# Patient Record
Sex: MALE | Race: WHITE | URBAN - METROPOLITAN AREA
[De-identification: names, ages, dates, MRNs, and addresses within clinical notes are randomized per-mention and may not be internally consistent; named-entity substitution may affect disease eponyms.]

---

## 2021-02-12 LAB
ABS NEUT (OLG): 4.98 X10(3)/MCL (ref 2.1–9.2)
BASOPHILS # BLD AUTO: 0.1 X10(3)/MCL (ref 0–0.2)
BASOPHILS NFR BLD AUTO: 1 %
BUN SERPL-MCNC: 15 MG/DL (ref 8.9–20.6)
CALCIUM SERPL-MCNC: 9.6 MG/DL (ref 8.4–10.2)
CHLORIDE SERPL-SCNC: 105 MMOL/L (ref 98–107)
CO2 SERPL-SCNC: 29 MMOL/L (ref 22–29)
CREAT SERPL-MCNC: 0.93 MG/DL (ref 0.73–1.18)
CREAT/UREA NIT SERPL: 16
EOSINOPHIL # BLD AUTO: 0.2 X10(3)/MCL (ref 0–0.9)
EOSINOPHIL NFR BLD AUTO: 2 %
ERYTHROCYTE [DISTWIDTH] IN BLOOD BY AUTOMATED COUNT: 14.3 % (ref 11.5–17)
GLUCOSE SERPL-MCNC: 94 MG/DL (ref 74–100)
HCT VFR BLD AUTO: 45.9 % (ref 42–52)
HGB BLD-MCNC: 14.7 GM/DL (ref 14–18)
LYMPHOCYTES # BLD AUTO: 2.9 X10(3)/MCL (ref 0.6–4.6)
LYMPHOCYTES NFR BLD AUTO: 33 %
MCH RBC QN AUTO: 26.3 PG (ref 27–31)
MCHC RBC AUTO-ENTMCNC: 32 GM/DL (ref 33–36)
MCV RBC AUTO: 82.3 FL (ref 80–94)
MONOCYTES # BLD AUTO: 0.7 X10(3)/MCL (ref 0.1–1.3)
MONOCYTES NFR BLD AUTO: 8 %
NEUTROPHILS # BLD AUTO: 4.98 X10(3)/MCL (ref 2.1–9.2)
NEUTROPHILS NFR BLD AUTO: 56 %
PLATELET # BLD AUTO: 292 X10(3)/MCL (ref 130–400)
PMV BLD AUTO: 10.2 FL (ref 9.4–12.4)
POTASSIUM SERPL-SCNC: 4.7 MMOL/L (ref 3.5–5.1)
RBC # BLD AUTO: 5.58 X10(6)/MCL (ref 4.7–6.1)
SODIUM SERPL-SCNC: 144 MMOL/L (ref 136–145)
WBC # SPEC AUTO: 8.9 X10(3)/MCL (ref 4.5–11.5)

## 2021-02-17 ENCOUNTER — HISTORICAL (OUTPATIENT)
Dept: ADMINISTRATIVE | Facility: HOSPITAL | Age: 51
End: 2021-02-17

## 2022-05-03 NOTE — HISTORICAL [facility] CERNER
This is a historical note converted from [place]. Formatting and pictures may have been removed.  Please reference [facility] for original formatting and attached multimedia. Indication for Surgery  The patient is a 50-year-old male who presented with back pain and left sciatica. ?Patients had intermittent back pain over the years. ?This time is mostly leg pain.? He tried conservative treatment without relief and lumbar MRI shows degenerative disc disease at L4/L5 and L5/S1?but he has a left L4/L5 disc herniation.? The recommendation at this time is a left L4/L5 discectomy. ?The patient understood the risk and benefits and consented  Preoperative Diagnosis  Left L4/L5 lumbar?disc herniation  Postoperative Diagnosis  Same  Operation  Left L4/L5 lumbar discectomy with the Metrix system.  Use of the microscope  Surgeon(s)  Jcarlos Alvarado MD  Assistant  None  Anesthesia  General  Estimated Blood Loss  5 mL  Findings  Disc herniation  Specimen(s)  None  Complications  None  Technique  The patient was taken to the operating room and placed on the table in supine position. ?After instillation of general anesthetic?patient was placed in the prone position and prepped and draped usual fashion.? Level was marked under fluoroscopy and then the?guidewire was inserted on the left at the L4/L5 level. ?Again position was confirmed under fluoroscopy.? Then a linear incision was made and then Metrix tubes were inserted?down to the lamina.? Positioning was confirmed?and then the microscope was brought in?and utilizing the air drill laminotomy was performed at L4.? Then the ligamentum flavum was carefully removed. ?Edges were bone wax.? Nerve root was identified. ?As it was gently mobilized and ?large disc herniation was noted.? Then utilizing straight and forward biting pituitary rongeurs discectomy was performed. ?Blunt nerve hook was passed along the nerve root was nicely decompressed.? Wound was irrigated after assuring hemostasis  with Surgi-Isaak.? Self retainers were carefully removed and the?tissues were injected with quarter percent Marcaine with Exparel.? The tube tract was lined with powdered Surgicel.? And then the fascial layer was reapproximated with?0 Vicryl in interrupted fashion.? The subcutaneous layer was closed with 2-0 Vicryl running fashion. ?The skin layer was closed with 4 oh?strata fix.? The incision was then covered with?Prineo dressing.? Also then covered with Telfa and tape.? Patient tolerated procedure well was transferred recovery room in stable condition